# Patient Record
Sex: FEMALE | Race: WHITE | NOT HISPANIC OR LATINO | Employment: OTHER | URBAN - METROPOLITAN AREA
[De-identification: names, ages, dates, MRNs, and addresses within clinical notes are randomized per-mention and may not be internally consistent; named-entity substitution may affect disease eponyms.]

---

## 2018-09-04 ENCOUNTER — OFFICE VISIT (OUTPATIENT)
Dept: GASTROENTEROLOGY | Facility: CLINIC | Age: 33
End: 2018-09-04

## 2018-09-04 VITALS
SYSTOLIC BLOOD PRESSURE: 124 MMHG | DIASTOLIC BLOOD PRESSURE: 64 MMHG | BODY MASS INDEX: 25.34 KG/M2 | HEIGHT: 61 IN | TEMPERATURE: 98.3 F | WEIGHT: 134.2 LBS

## 2018-09-04 DIAGNOSIS — R14.0 BLOATING SYMPTOM: ICD-10-CM

## 2018-09-04 DIAGNOSIS — K59.04 CHRONIC IDIOPATHIC CONSTIPATION: Primary | ICD-10-CM

## 2018-09-04 LAB
BASOPHILS # BLD AUTO: 0.03 10*3/MM3 (ref 0–0.2)
BASOPHILS NFR BLD AUTO: 0.3 % (ref 0–1.5)
EOSINOPHIL # BLD AUTO: 0.26 10*3/MM3 (ref 0–0.7)
EOSINOPHIL NFR BLD AUTO: 3 % (ref 0.3–6.2)
ERYTHROCYTE [DISTWIDTH] IN BLOOD BY AUTOMATED COUNT: 13.4 % (ref 11.7–13)
HCT VFR BLD AUTO: 40.3 % (ref 35.6–45.5)
HGB BLD-MCNC: 12.5 G/DL (ref 11.9–15.5)
IMM GRANULOCYTES # BLD: 0.03 10*3/MM3 (ref 0–0.03)
IMM GRANULOCYTES NFR BLD: 0.3 % (ref 0–0.5)
LYMPHOCYTES # BLD AUTO: 3.04 10*3/MM3 (ref 0.9–4.8)
LYMPHOCYTES NFR BLD AUTO: 35.2 % (ref 19.6–45.3)
MCH RBC QN AUTO: 29.6 PG (ref 26.9–32)
MCHC RBC AUTO-ENTMCNC: 31 G/DL (ref 32.4–36.3)
MCV RBC AUTO: 95.3 FL (ref 80.5–98.2)
MONOCYTES # BLD AUTO: 0.39 10*3/MM3 (ref 0.2–1.2)
MONOCYTES NFR BLD AUTO: 4.5 % (ref 5–12)
NEUTROPHILS # BLD AUTO: 4.88 10*3/MM3 (ref 1.9–8.1)
NEUTROPHILS NFR BLD AUTO: 56.7 % (ref 42.7–76)
PLATELET # BLD AUTO: 218 10*3/MM3 (ref 140–500)
RBC # BLD AUTO: 4.23 10*6/MM3 (ref 3.9–5.2)
WBC # BLD AUTO: 8.63 10*3/MM3 (ref 4.5–10.7)

## 2018-09-04 PROCEDURE — 99204 OFFICE O/P NEW MOD 45 MIN: CPT | Performed by: INTERNAL MEDICINE

## 2018-09-04 RX ORDER — VALACYCLOVIR HYDROCHLORIDE 500 MG/1
500 TABLET, FILM COATED ORAL AS NEEDED
COMMUNITY
Start: 2018-08-02

## 2018-09-04 NOTE — PROGRESS NOTES
Chief Complaint   Patient presents with   • Bloated   • Abdominal Pain   • Constipation     Subjective      HPI     Heena Toledo is a 32 y.o. female who presents for evaluation of constipation. Dating back to childhood.  Bowels can go a week at times between movements.    Reports associated bloating.  Will drink detox tea once/week with good results.  Does not like the idea of taking laxative or stool softener.  Will sometimes have to manually disempact. Reports adequate H2O intake.  Does not exercise as much as she used to.  No blood/melena.    Past Medical History:   Diagnosis Date   • DVT (deep vein thrombosis) in pregnancy (CMS/[facility]) 2014       Current Outpatient Prescriptions:   •  valACYclovir (VALTREX) 500 MG tablet, Take 500 mg by mouth As Needed., Disp: , Rfl:   No Known Allergies  Social History     Social History   • Marital status:      Spouse name: N/A   • Number of children: N/A   • Years of education: N/A     Occupational History   • Not on file.     Social History Main Topics   • Smoking status: Former Smoker   • Smokeless tobacco: Not on file   • Alcohol use Not on file   • Drug use: Unknown   • Sexual activity: Not on file     Other Topics Concern   • Not on file     Social History Narrative   • No narrative on file     Family History   Problem Relation Age of Onset   • Diverticulitis Mother    • Ulcerative colitis Mother      Review of Systems   Constitutional: Negative.    Respiratory: Negative.    Cardiovascular: Negative.    Gastrointestinal: Positive for constipation.        Bloating     All other systems reviewed and are negative.    Objective   Vitals:    09/04/18 1349   BP: 124/64   Temp: 98.3 °F (36.8 °C)     Physical Exam   Constitutional: She is oriented to person, place, and time. She appears well-developed and well-nourished.   HENT:   Head: Normocephalic and atraumatic.   Mouth/Throat: Oropharynx is clear and moist.   Eyes: EOM are normal. No scleral icterus.   Neck: Normal  range of motion. Neck supple. No thyromegaly present.   Cardiovascular: Normal rate, regular rhythm and normal heart sounds.  Exam reveals no gallop and no friction rub.    No murmur heard.  Pulmonary/Chest: Effort normal and breath sounds normal. She has no wheezes. She has no rales. She exhibits no tenderness.   Abdominal: Soft. Bowel sounds are normal. She exhibits no distension. There is no tenderness. There is no rebound and no guarding. No hernia.   Musculoskeletal: Normal range of motion. She exhibits no edema.   Lymphadenopathy:     She has no cervical adenopathy.   Neurological: She is alert and oriented to person, place, and time.   Skin: Skin is warm and dry.   Psychiatric: She has a normal mood and affect. Judgment and thought content normal.   Vitals reviewed.    Assessment/Plan   Assessment:     1. Chronic idiopathic constipation    2. Bloating symptom      Plan:   Check routine labs today, as well as TSH and celiac panel  Start Linzess 145mcg/day  KUB    If bloating symptoms do not improve with more regular BMs, consider SIBO breath testing.      Follow up 6 weeks        John Mcgill M.D.  [facility]  [address] [place] [address]  Office: [phone]

## 2018-09-05 LAB
GLIADIN PEPTIDE IGA SER-ACNC: 3 UNITS (ref 0–19)
GLIADIN PEPTIDE IGG SER-ACNC: 3 UNITS (ref 0–19)
IGA SERPL-MCNC: 214 MG/DL (ref 87–352)
TSH SERPL DL<=0.005 MIU/L-ACNC: 1.68 MIU/ML (ref 0.27–4.2)
TTG IGA SER-ACNC: <2 U/ML (ref 0–3)
TTG IGG SER-ACNC: <2 U/ML (ref 0–5)

## 2018-09-13 ENCOUNTER — TELEPHONE (OUTPATIENT)
Dept: GASTROENTEROLOGY | Facility: CLINIC | Age: 33
End: 2018-09-13

## 2018-09-13 NOTE — TELEPHONE ENCOUNTER
----- Message from John Mcgill MD sent at 9/5/2018  5:08 PM EDT -----  Labs all ok  No celiac or thyroid abnormalities  We await KUB results

## 2018-09-13 NOTE — TELEPHONE ENCOUNTER
Patient called, advised as per Dr. Mcgill her lab work was all ok. There is no celiac or thyroid abnormalities. Patient verb understanding. She states her diarrhea symptoms have subsided and she states if they return she will have the KUB done. Advised will send an update to Dr. Mcgill.

## 2019-10-04 ENCOUNTER — HOSPITAL ENCOUNTER (EMERGENCY)
Facility: HOSPITAL | Age: 34
Discharge: LEFT BEFORE DISCHARGE | End: 2019-10-04

## 2019-10-04 VITALS — TEMPERATURE: 96.7 F | OXYGEN SATURATION: 100 % | RESPIRATION RATE: 18 BRPM | HEART RATE: 80 BPM

## 2020-03-10 ENCOUNTER — TRANSCRIBE ORDERS (OUTPATIENT)
Dept: PHYSICAL THERAPY | Facility: CLINIC | Age: 35
End: 2020-03-10

## 2020-03-10 DIAGNOSIS — M54.2 PAIN, NECK: Primary | ICD-10-CM

## 2020-03-10 DIAGNOSIS — M25.511 RIGHT SHOULDER PAIN, UNSPECIFIED CHRONICITY: ICD-10-CM

## 2023-08-30 ENCOUNTER — OFFICE VISIT (OUTPATIENT)
Dept: GASTROENTEROLOGY | Facility: CLINIC | Age: 38
End: 2023-08-30
Payer: COMMERCIAL

## 2023-08-30 VITALS
DIASTOLIC BLOOD PRESSURE: 70 MMHG | HEART RATE: 70 BPM | HEIGHT: 61 IN | OXYGEN SATURATION: 98 % | SYSTOLIC BLOOD PRESSURE: 102 MMHG | TEMPERATURE: 97.5 F | WEIGHT: 134.6 LBS | BODY MASS INDEX: 25.41 KG/M2

## 2023-08-30 DIAGNOSIS — R10.32 BILATERAL LOWER ABDOMINAL CRAMPING: ICD-10-CM

## 2023-08-30 DIAGNOSIS — K64.8 INTERNAL HEMORRHOIDS: ICD-10-CM

## 2023-08-30 DIAGNOSIS — R19.5 CHANGE IN STOOL: ICD-10-CM

## 2023-08-30 DIAGNOSIS — K58.0 IRRITABLE BOWEL SYNDROME WITH DIARRHEA: ICD-10-CM

## 2023-08-30 DIAGNOSIS — R10.31 BILATERAL LOWER ABDOMINAL CRAMPING: ICD-10-CM

## 2023-08-30 DIAGNOSIS — K62.5 RECTAL BLEEDING: Primary | ICD-10-CM

## 2023-08-30 DIAGNOSIS — R10.13 DYSPEPSIA: ICD-10-CM

## 2023-08-30 RX ORDER — LEVALBUTEROL TARTRATE 45 UG/1
1-2 AEROSOL, METERED ORAL AS NEEDED
COMMUNITY

## 2023-08-30 RX ORDER — POLYETHYLENE GLYCOL 3350, SODIUM CHLORIDE, SODIUM BICARBONATE, POTASSIUM CHLORIDE 420; 11.2; 5.72; 1.48 G/4L; G/4L; G/4L; G/4L
POWDER, FOR SOLUTION ORAL
COMMUNITY

## 2023-08-30 RX ORDER — FAMOTIDINE 20 MG/1
TABLET, FILM COATED ORAL
COMMUNITY
Start: 2023-08-22

## 2023-08-30 RX ORDER — HYDROCORTISONE 25 MG/G
CREAM TOPICAL
COMMUNITY
Start: 2023-08-22

## 2023-08-30 NOTE — PROGRESS NOTES
"Chief Complaint  Rectal Bleeding    Subjective          History of Present Illness    Heena Toledo is a  37 y.o. female presents for evaluation of rectal bleeding.  She is a patient Dr. Mcgill last seen on 2018.  She is new to me.    She saw Dr. Seals on 7/5/2022 for colonoscopy and EGD for epigastric and left upper quadrant pain as well as rectal bleeding.  Reviewed note which which revealed unremarkable upper and lower endoscopy other than few small internal hemorrhoids.  No biopsies taken.  She reports improvement in her upper abdominal pain from last year.  Occasional flares described as a \"gnawing\" feeling.  She noticed symptoms correlated with collagen intake so she has stopped this.  She did take omeprazole 20 mg daily for a short time which also seem to help symptoms.    Today she reports daily bloody mucus, clots, mucous stools and white chunk in stools starting about a month ago. She she does have daily Bms that are not hard and bloating. She has had a few episodes of watery stools.  Admits to LLQ pain for several days. She is taking metamucil off and on, and has increased fluids. Does have history of constipation. She is using hemorrhoid suppositories which are not helping the bleeding. Tried Ibgard and probitiocs last year with no help.     Had labs done last week with PCP--normal per patient.  We will request.  She has never had an EGD or colonoscopy.    Mother has a history of diverticulitis and ulcerative colitis.    Objective   Vital Signs:   /70   Pulse 70   Temp 97.5 øF (36.4 øC)   Ht 153.7 cm (60.5\")   Wt 61.1 kg (134 lb 9.6 oz)   SpO2 98%   BMI 25.85 kg/mý       Physical Exam  Vitals reviewed.   Constitutional:       General: She is awake. She is not in acute distress.     Appearance: Normal appearance. She is well-developed and well-groomed.   HENT:      Head: Normocephalic.   Pulmonary:      Effort: Pulmonary effort is normal. No respiratory distress.   Abdominal:      " General: Abdomen is flat. Bowel sounds are normal. There is no distension.      Palpations: Abdomen is soft. There is no hepatomegaly or mass.      Tenderness: There is abdominal tenderness in the right lower quadrant, suprapubic area and left lower quadrant. There is no guarding or rebound.   Skin:     Coloration: Skin is not pale.   Neurological:      Mental Status: She is alert and oriented to person, place, and time.      Gait: Gait is intact.   Psychiatric:         Mood and Affect: Mood and affect normal.         Speech: Speech normal.         Behavior: Behavior is cooperative.         Judgment: Judgment normal.        Result Review :             Assessment and Plan    Diagnoses and all orders for this visit:    1. Rectal bleeding (Primary)  -     CT Abdomen Pelvis With Contrast    2. Dyspepsia  -     H. Pylori Breath Test - Breath, Lung  -     CT Abdomen Pelvis With Contrast    3. Internal hemorrhoids    4. Change in stool  -     CT Abdomen Pelvis With Contrast    5. Bilateral lower abdominal cramping  -     CT Abdomen Pelvis With Contrast    6. Irritable bowel syndrome with diarrhea    Recommend proceeding with CT abdomen pelvis for left lower quadrant pain and lower abdominal TTP.  If this is unremarkable would consider xifaxan.  Suspect IBS component to symptoms.    In the meantime recommended she trial fiber supplement daily to see if this regulates her stools.  She had a colonoscopy 1 year ago with a surgeon that was unremarkable.  If bleeding persist, could consider flexible sigmoidoscopy.    Given upper GI symptoms last year, will check H. Pylori breath test.  Apparently her mother has had issues with adjusting her whole life and possibly H. pylori.      Follow Up   Return in about 6 weeks (around 10/11/2023) for  Mimi Garcia, or Dr. Mcgill.    Dragon dictation used throughout this note.     Mimi Sanchez PA-C

## 2023-08-31 LAB
H. PYLORI BREATH COLLECTION: NORMAL
UREA BREATH TEST QL: NEGATIVE

## 2023-09-01 ENCOUNTER — TELEPHONE (OUTPATIENT)
Dept: GASTROENTEROLOGY | Facility: CLINIC | Age: 38
End: 2023-09-01
Payer: COMMERCIAL

## 2023-09-01 NOTE — TELEPHONE ENCOUNTER
Called pcp to have labs faxed over. Will upload them when received       ----- Message from Mimi Sanchez PA-C sent at 8/30/2023  9:23 AM EDT -----  Request labs from PCP/[facility] done last week.

## 2023-09-05 NOTE — TELEPHONE ENCOUNTER
Called patient and left message per ANTONY Garcia message. Also sent on [employer].        Per Radha:  I reviewed her labs under the media tab.  All of her labs look normal.  No evidence of anemia and her labs do not indicate anything that would be contributing to her abdominal pain.  We will follow-up on the CT scan once that is completed.

## 2024-04-08 ENCOUNTER — OFFICE VISIT (OUTPATIENT)
Dept: GASTROENTEROLOGY | Facility: CLINIC | Age: 39
End: 2024-04-08
Payer: COMMERCIAL

## 2024-04-08 VITALS
TEMPERATURE: 96.7 F | HEIGHT: 60 IN | BODY MASS INDEX: 26.11 KG/M2 | OXYGEN SATURATION: 98 % | WEIGHT: 133 LBS | SYSTOLIC BLOOD PRESSURE: 110 MMHG | HEART RATE: 85 BPM | DIASTOLIC BLOOD PRESSURE: 77 MMHG

## 2024-04-08 DIAGNOSIS — K51.211 ULCERATIVE PROCTITIS WITH RECTAL BLEEDING: Primary | ICD-10-CM

## 2024-04-08 LAB
ALBUMIN SERPL-MCNC: 4.3 G/DL (ref 3.5–5.2)
ALBUMIN/GLOB SERPL: 2 G/DL
ALP SERPL-CCNC: 64 U/L (ref 39–117)
ALT SERPL-CCNC: 17 U/L (ref 1–33)
AST SERPL-CCNC: 13 U/L (ref 1–32)
BASOPHILS # BLD AUTO: 0.03 10*3/MM3 (ref 0–0.2)
BASOPHILS NFR BLD AUTO: 0.3 % (ref 0–1.5)
BILIRUB SERPL-MCNC: 0.4 MG/DL (ref 0–1.2)
BUN SERPL-MCNC: 9 MG/DL (ref 6–20)
BUN/CREAT SERPL: 9.3 (ref 7–25)
CALCIUM SERPL-MCNC: 9.6 MG/DL (ref 8.6–10.5)
CHLORIDE SERPL-SCNC: 100 MMOL/L (ref 98–107)
CO2 SERPL-SCNC: 32.8 MMOL/L (ref 22–29)
CREAT SERPL-MCNC: 0.97 MG/DL (ref 0.57–1)
CRP SERPL-MCNC: <0.3 MG/DL (ref 0–0.5)
EGFRCR SERPLBLD CKD-EPI 2021: 76.9 ML/MIN/1.73
EOSINOPHIL # BLD AUTO: 0.5 10*3/MM3 (ref 0–0.4)
EOSINOPHIL NFR BLD AUTO: 4.4 % (ref 0.3–6.2)
ERYTHROCYTE [DISTWIDTH] IN BLOOD BY AUTOMATED COUNT: 13.6 % (ref 12.3–15.4)
ERYTHROCYTE [SEDIMENTATION RATE] IN BLOOD BY WESTERGREN METHOD: 3 MM/HR (ref 0–20)
GLOBULIN SER CALC-MCNC: 2.2 GM/DL
GLUCOSE SERPL-MCNC: 76 MG/DL (ref 65–99)
HCT VFR BLD AUTO: 42.7 % (ref 34–46.6)
HGB BLD-MCNC: 13.8 G/DL (ref 12–15.9)
IMM GRANULOCYTES # BLD AUTO: 0.05 10*3/MM3 (ref 0–0.05)
IMM GRANULOCYTES NFR BLD AUTO: 0.4 % (ref 0–0.5)
LYMPHOCYTES # BLD AUTO: 3.3 10*3/MM3 (ref 0.7–3.1)
LYMPHOCYTES NFR BLD AUTO: 29.1 % (ref 19.6–45.3)
MCH RBC QN AUTO: 29.2 PG (ref 26.6–33)
MCHC RBC AUTO-ENTMCNC: 32.3 G/DL (ref 31.5–35.7)
MCV RBC AUTO: 90.3 FL (ref 79–97)
MONOCYTES # BLD AUTO: 0.77 10*3/MM3 (ref 0.1–0.9)
MONOCYTES NFR BLD AUTO: 6.8 % (ref 5–12)
NEUTROPHILS # BLD AUTO: 6.7 10*3/MM3 (ref 1.7–7)
NEUTROPHILS NFR BLD AUTO: 59 % (ref 42.7–76)
NRBC BLD AUTO-RTO: 0 /100 WBC (ref 0–0.2)
PLATELET # BLD AUTO: 247 10*3/MM3 (ref 140–450)
POTASSIUM SERPL-SCNC: 4.1 MMOL/L (ref 3.5–5.2)
PROT SERPL-MCNC: 6.5 G/DL (ref 6–8.5)
RBC # BLD AUTO: 4.73 10*6/MM3 (ref 3.77–5.28)
SODIUM SERPL-SCNC: 141 MMOL/L (ref 136–145)
WBC # BLD AUTO: 11.35 10*3/MM3 (ref 3.4–10.8)

## 2024-04-08 PROCEDURE — 99214 OFFICE O/P EST MOD 30 MIN: CPT | Performed by: PHYSICIAN ASSISTANT

## 2024-04-08 RX ORDER — MESALAMINE 4 G/60ML
4 SUSPENSION RECTAL DAILY
COMMUNITY
Start: 2024-03-20 | End: 2024-04-08

## 2024-04-08 RX ORDER — MESALAMINE 4 G/60ML
SUSPENSION RECTAL
Qty: 60 ML | Refills: 5 | Status: SHIPPED | OUTPATIENT
Start: 2024-04-08

## 2024-04-08 NOTE — PROGRESS NOTES
"Chief Complaint  Rectal Bleeding, Constipation, and Ulcerative Colitis    Subjective          History of Present Illness    Heena Toledo is a  38 y.o. female presents for follow-up on rectal bleeding, constipation.  She is a patient of Dr. Mcgill last seen by me on 8/30/2023.    Seen previously for same symptoms but they did get better for several months but now worsening over the last 2 months.  Reports ongoing symptoms of rectal bleeding, clots, mucousy stools. Bms are variable.  Underwent endoscopies with Dr. Mcgrath which showed ulcerative proctitis.  Darted on hydrocortisone enemas helped initially but worsened about 5 days ago despite still being on the enemas.  She is now off of them. Failed hemorrhoid suppositories, IBgard and probiotics in the past.    Did not tolerate hydrocortisone enemas well with the following side effects--lightheadedness, fatigue, racing heart, sweating, edema.    8/30/2023 H. pylori breath test negative.    3/15/24 EGD/Colonoscopy with Dr. Mcgrath recently which showed ulcerative proctitis.  Small polyp in ascending colon-Path with serrated polyp..  EGD unremarkable-no path.  Proctitis path with moderate to severe active proctitis with ulceration, acute cryptitis, and crypt abscess formation.     From 8/30/2023 office note:  7/5/2022 for colonoscopy and EGD for epigastric and left upper quadrant pain as well as rectal bleeding.  Reviewed note which which revealed unremarkable upper and lower endoscopy other than few small internal hemorrhoids.  No biopsies taken.     Mother has a history of diverticulitis and stomach ulcers.  No family history of IBD.    Objective   Vital Signs:   /77   Pulse 85   Temp 96.7 °F (35.9 °C) (Temporal)   Ht 152.4 cm (60\")   Wt 60.3 kg (133 lb)   SpO2 98%   BMI 25.97 kg/m²       Physical Exam  Vitals reviewed.   Constitutional:       General: She is awake. She is not in acute distress.     Appearance: Normal appearance. She is " well-developed and well-groomed.   HENT:      Head: Normocephalic.   Pulmonary:      Effort: Pulmonary effort is normal. No respiratory distress.   Abdominal:      General: Abdomen is flat. Bowel sounds are normal. There is no distension.      Palpations: Abdomen is soft. There is no hepatomegaly or mass.      Tenderness: There is abdominal tenderness in the right lower quadrant. There is guarding. There is no rebound.   Skin:     Coloration: Skin is not pale.   Neurological:      Mental Status: She is alert and oriented to person, place, and time.      Gait: Gait is intact.   Psychiatric:         Mood and Affect: Mood and affect normal.         Speech: Speech normal.         Behavior: Behavior is cooperative.         Judgment: Judgment normal.          Result Review :             Assessment and Plan    Diagnoses and all orders for this visit:    1. Ulcerative proctitis with rectal bleeding (Primary)  -     CBC & Differential  -     Comprehensive Metabolic Panel  -     C-reactive Protein  -     Sedimentation Rate  -     CT Abdomen Pelvis With Contrast    Other orders  -     mesalamine (ROWASA) 4 g enema; Insert one enema qAM and qPM for 30mins, try and retain overnight  Dispense: 60 mL; Refill: 5    Will start her on mesalamine enemas twice daily for now and then reduce to once daily after about 6 to 8 weeks.  Consider adding in oral mesalamine.    Check labs above given rectal bleeding to make sure not anemic.  Also monitor inflammatory markers.  Recommend CT scan as Dr. Mcgrath had trouble reaching the TI and she does have some RLQ tenderness.  Need to ensure she does not have Crohn's disease.    Per patient, Dr. Mcgrath recommended repeat colonoscopy in 3 months. Will hold off for now and see if we can get her symptomatically better first.  She will need follow-up colonoscopy to reassess mucosa at a later date    Follow Up   Return in about 6 weeks (around 5/20/2024).    Dragon dictation used throughout this  note.     Mimi Sanchez PA-C

## 2024-04-09 NOTE — PROGRESS NOTES
Overall your labs look good.  No evidence of anemia, liver and kidney functions are normal.  Inflammatory markers are normal.  You do have a mild elevation in your white blood cell count-unclear source and we can recheck this at next appointment.

## 2024-04-14 ENCOUNTER — PATIENT MESSAGE (OUTPATIENT)
Dept: GASTROENTEROLOGY | Facility: CLINIC | Age: 39
End: 2024-04-14
Payer: COMMERCIAL